# Patient Record
Sex: MALE | Race: WHITE | NOT HISPANIC OR LATINO | URBAN - METROPOLITAN AREA
[De-identification: names, ages, dates, MRNs, and addresses within clinical notes are randomized per-mention and may not be internally consistent; named-entity substitution may affect disease eponyms.]

---

## 2023-11-07 PROCEDURE — 88305 TISSUE EXAM BY PATHOLOGIST: CPT | Performed by: DERMATOLOGY

## 2023-11-07 PROCEDURE — 88350 IMFLUOR EA ADDL 1ANTB STN PX: CPT | Performed by: DERMATOLOGY

## 2023-11-07 PROCEDURE — 88346 IMFLUOR 1ST 1ANTB STAIN PX: CPT | Performed by: DERMATOLOGY

## 2023-11-09 ENCOUNTER — LAB REQUISITION (OUTPATIENT)
Dept: LAB | Facility: HOSPITAL | Age: 83
End: 2023-11-09
Payer: MEDICARE

## 2023-11-09 DIAGNOSIS — L98.9 DISORDER OF THE SKIN AND SUBCUTANEOUS TISSUE, UNSPECIFIED: ICD-10-CM

## 2023-11-17 LAB
LAB AP ASR DISCLAIMER: NORMAL
LABORATORY COMMENT REPORT: NORMAL
PATH REPORT.FINAL DX SPEC: NORMAL
PATH REPORT.GROSS SPEC: NORMAL
PATH REPORT.MICROSCOPIC SPEC OTHER STN: NORMAL
PATH REPORT.RELEVANT HX SPEC: NORMAL
PATH REPORT.TOTAL CANCER: NORMAL

## 2024-06-12 ENCOUNTER — LAB REQUISITION (OUTPATIENT)
Dept: LAB | Facility: HOSPITAL | Age: 84
End: 2024-06-12
Payer: COMMERCIAL

## 2024-06-13 LAB
LABORATORY COMMENT REPORT: NORMAL
PATH REPORT.GROSS SPEC: NORMAL
PATH REPORT.TOTAL CANCER: NORMAL

## 2024-09-11 ENCOUNTER — LAB REQUISITION (OUTPATIENT)
Dept: LAB | Facility: HOSPITAL | Age: 84
End: 2024-09-11
Payer: COMMERCIAL

## 2024-09-12 LAB
LABORATORY COMMENT REPORT: NORMAL
PATH REPORT.GROSS SPEC: NORMAL
PATH REPORT.TOTAL CANCER: NORMAL

## 2024-09-18 ENCOUNTER — LAB REQUISITION (OUTPATIENT)
Dept: LAB | Facility: HOSPITAL | Age: 84
End: 2024-09-18
Payer: COMMERCIAL

## 2024-09-19 LAB
LABORATORY COMMENT REPORT: NORMAL
PATH REPORT.GROSS SPEC: NORMAL
PATH REPORT.TOTAL CANCER: NORMAL

## 2025-01-01 ENCOUNTER — HOSPITAL ENCOUNTER (EMERGENCY)
Facility: HOSPITAL | Age: 85
End: 2025-02-05
Attending: EMERGENCY MEDICINE
Payer: MEDICARE

## 2025-01-01 VITALS — OXYGEN SATURATION: 45 %

## 2025-01-01 DIAGNOSIS — I46.9 CARDIOPULMONARY ARREST (MULTI): Primary | ICD-10-CM

## 2025-01-01 LAB
ANION GAP BLDV CALCULATED.4IONS-SCNC: 21 MMOL/L (ref 10–25)
BASE EXCESS BLDV CALC-SCNC: -16.9 MMOL/L (ref -2–3)
BODY TEMPERATURE: ABNORMAL
CA-I BLDV-SCNC: 1.2 MMOL/L (ref 1.1–1.33)
CHLORIDE BLDV-SCNC: 101 MMOL/L (ref 98–107)
CRITICAL CALL TIME: 1434
CRITICAL CALLED BY: ABNORMAL
CRITICAL CALLED TO: ABNORMAL
CRITICAL READ BACK: ABNORMAL
GLUCOSE BLDV-MCNC: 225 MG/DL (ref 74–99)
HCO3 BLDV-SCNC: 17.7 MMOL/L (ref 22–26)
HCT VFR BLD EST: 35 % (ref 41–52)
HGB BLDV-MCNC: 11.6 G/DL (ref 13.5–17.5)
LACTATE BLDV-SCNC: 9.2 MMOL/L (ref 0.4–2)
OXYHGB MFR BLDV: 27.4 % (ref 45–75)
PCO2 BLDV: 99 MM HG (ref 41–51)
PH BLDV: 6.86 PH (ref 7.33–7.43)
PO2 BLDV: 32 MM HG (ref 35–45)
POTASSIUM BLDV-SCNC: 6.2 MMOL/L (ref 3.5–5.3)
SAO2 % BLDV: 28 % (ref 45–75)
SODIUM BLDV-SCNC: 133 MMOL/L (ref 136–145)

## 2025-01-01 PROCEDURE — 99285 EMERGENCY DEPT VISIT HI MDM: CPT | Mod: 25 | Performed by: EMERGENCY MEDICINE

## 2025-01-01 PROCEDURE — 82435 ASSAY OF BLOOD CHLORIDE: CPT

## 2025-01-01 PROCEDURE — 36680 INSERT NEEDLE BONE CAVITY: CPT

## 2025-01-01 PROCEDURE — 92950 HEART/LUNG RESUSCITATION CPR: CPT

## 2025-01-01 PROCEDURE — 92950 HEART/LUNG RESUSCITATION CPR: CPT | Performed by: EMERGENCY MEDICINE

## 2025-01-01 PROCEDURE — 2500000004 HC RX 250 GENERAL PHARMACY W/ HCPCS (ALT 636 FOR OP/ED): Performed by: EMERGENCY MEDICINE

## 2025-01-01 PROCEDURE — 31500 INSERT EMERGENCY AIRWAY: CPT | Performed by: EMERGENCY MEDICINE

## 2025-01-01 PROCEDURE — 99285 EMERGENCY DEPT VISIT HI MDM: CPT | Performed by: EMERGENCY MEDICINE

## 2025-01-01 PROCEDURE — 82810 BLOOD GASES O2 SAT ONLY: CPT

## 2025-01-01 PROCEDURE — 31500 INSERT EMERGENCY AIRWAY: CPT

## 2025-01-01 RX ORDER — SODIUM CHLORIDE 9 MG/ML
INJECTION, SOLUTION INTRAVENOUS
Status: COMPLETED | OUTPATIENT
Start: 2025-01-01 | End: 2025-01-01

## 2025-01-01 RX ORDER — INDOMETHACIN 25 MG/1
CAPSULE ORAL CODE/TRAUMA/SEDATION MEDICATION
Status: COMPLETED | OUTPATIENT
Start: 2025-01-01 | End: 2025-01-01

## 2025-01-01 RX ORDER — EPINEPHRINE 0.1 MG/ML
INJECTION INTRACARDIAC; INTRAVENOUS CODE/TRAUMA/SEDATION MEDICATION
Status: COMPLETED | OUTPATIENT
Start: 2025-01-01 | End: 2025-01-01

## 2025-01-01 RX ADMIN — SODIUM BICARBONATE 50 MEQ: 84 INJECTION, SOLUTION INTRAVENOUS at 14:29

## 2025-01-01 RX ADMIN — SODIUM CHLORIDE 125 ML/HR: 9 INJECTION, SOLUTION INTRAVENOUS at 14:27

## 2025-01-01 RX ADMIN — EPINEPHRINE 1 MG: 0.1 INJECTION INTRACARDIAC; INTRAVENOUS at 14:31

## 2025-01-01 RX ADMIN — EPINEPHRINE 1 MG: 0.1 INJECTION INTRACARDIAC; INTRAVENOUS at 14:28

## 2025-02-05 NOTE — ED PROVIDER NOTES
HPI   Chief Complaint   Patient presents with    Cardiac Arrest       HPI        Patient History   No past medical history on file.  No past surgical history on file.  No family history on file.  Social History     Tobacco Use    Smoking status: Not on file    Smokeless tobacco: Not on file   Substance Use Topics    Alcohol use: Not on file    Drug use: Not on file       Physical Exam   ED Triage Vitals   Temp Heart Rate Respirations BP   -- 02/05/25 1431 02/05/25 1431 --    (!) 101 (!) 101       Pulse Ox Temp src Heart Rate Source Patient Position   02/05/25 1430 -- -- --   (!) 61 %         BP Location FiO2 (%)     -- --             Physical Exam      ED Course & MDM   Diagnoses as of 02/05/25 1508   Cardiopulmonary arrest (Multi)                 No data recorded     Kim Coma Scale Score: 3 (02/05/25 1441 : Misa Leone RN)                           Medical Decision Making  The patient was seen by the resident/fellow.  I have personally performed a substantive portion of the encounter.  I have seen and examined the patient; agree with the workup, evaluation, MDM, management and diagnosis.  The care plan has been discussed with the resident; I have reviewed the resident's note and agree with the documented findings.      This is a 84-year-old male who presents to the ER in cardiac arrest.  Patient was given epinephrine x 3 by EMS.  Initially patient was in V-fib and shocked him in asystole.  I gel airway was placed.  We intubated him on arrival here.  Patient was in the bathroom and fell.  His family member or caregiver found him in there.  EMS was called out 30 minutes prior to the arrival here.  Patient did have left leg tibial intraosseous line placed.  CPR was in progress on arrival here.  Glucose was in the 200s for EMS.  He was reportedly just in the hospital for a fall.    There is no records available here in epic.    No spontaneous breath sounds.  No spontaneous cardiac sounds.  Pupils are fixed and  dilated.  No purposeful movements.    CPR was continued here.  Please see CPR flowsheet for medications.  Patient was given epinephrine and bicarb.    Patient was in asystole here and PEA.  He was a very bradycardic PEA.    At the time of death when the code was called we did bedside ultrasound.  There is no organized cardiac activity.  There was PEA on the ultrasound.  No palpable pulses.    Time of death        Procedure  Procedures

## 2025-02-05 NOTE — ED PROCEDURE NOTE
Procedure  Intubation    Performed by: Mau Malik DO  Authorized by: Felipe Negro DO    Consent:     Consent obtained:  Emergent situation  Universal protocol:     Patient identity confirmed:  Arm band and hospital-assigned identification number  Pre-procedure details:     Indications: cardio/pulmonary arrest      Obstruction: none      Pharmacologic strategy: none    Procedure details:     Preoxygenation:  Bag valve mask    Number of attempts:  1  Successful intubation attempt details:     Intubation method:  Oral    Intubation technique: video assisted      Laryngoscope blade:  Hypercurved    Bougie used: no      Tube size (mm):  7.5    Tube type:  Cuffed    Tube visualized through cords: yes    Placement assessment:     ETT at teeth/gumline (cm):  25    Tube secured with:  ETT bridges    Breath sounds:  Equal and absent over the epigastrium    Placement verification: chest rise, colorimetric ETCO2, direct visualization and waveform ETCO2      CXR findings:  Appropriate position  Post-procedure details:     Procedure completion:  Tolerated               Mau Malik DO  Resident  02/05/25 2840

## 2025-02-05 NOTE — ED PROVIDER NOTES
EMERGENCY DEPARTMENT ENCOUNTER      Pt Name: Georgi Hong  MRN: [id number]  Birthdate 1940  Date of evaluation: 2/5/2025  Provider: Mau Malik DO    CHIEF COMPLAINT       Chief Complaint   Patient presents with    Cardiac Arrest         HISTORY OF PRESENT ILLNESS    Patient is an 84-year-old male presenting to the emergency department as a full cardiac arrest.  Brought in via EMS.  Patient was found at home, bystander CPR was performed.  On arrival to the ER there was around 30 minutes of CPR that was performed.          Nursing Notes were reviewed.    PAST MEDICAL HISTORY   No past medical history on file.      SURGICAL HISTORY     No past surgical history on file.      CURRENT MEDICATIONS       Previous Medications    No medications on file       ALLERGIES     Patient has no allergy information on record.    FAMILY HISTORY     No family history on file.       SOCIAL HISTORY          SCREENINGS                        PHYSICAL EXAM    (up to 7 for level 4, 8 or more for level 5)     ED Triage Vitals   Temp Pulse Resp BP   -- -- -- --      SpO2 Temp src Heart Rate Source Patient Position   -- -- -- --      BP Location FiO2 (%)     -- --       Physical Exam     DIAGNOSTIC RESULTS     LABS:  Labs Reviewed   BLOOD GAS VENOUS FULL PANEL UNSOLICITED - Abnormal       Result Value    POCT pH, Venous 6.86 (*)     POCT pCO2, Venous 99 (*)     POCT pO2, Venous 32 (*)     POCT SO2, Venous 28 (*)     POCT Oxy Hemoglobin, Venous 27.4 (*)     POCT Hematocrit Calculated, Venous 35.0 (*)     POCT Sodium, Venous 133 (*)     POCT Potassium, Venous 6.2 (*)     POCT Chloride, Venous 101      POCT Ionized Calicum, Venous 1.20      POCT Glucose, Venous 225 (*)     POCT Lactate, Venous 9.2 (*)     POCT Base Excess, Venous -16.9 (*)     POCT HCO3 Calculated, Venous 17.7 (*)     POCT Hemoglobin, Venous 11.6 (*)     POCT Anion Gap, Venous 21.0      Patient Temperature        Critical Called By MT RT      Critical Called To MD MALIK       Critical Call Time 1434      Critical Read Back Y     BLOOD GAS LACTIC ACID, VENOUS       All other labs were within normal range or not returned as of this dictation.    Imaging  No orders to display        Procedures  Please see separate procedure documentation for further details.     EMERGENCY DEPARTMENT COURSE/MDM:   Medical Decision Making  84-year-old male presenting today via EMS as a full cardiac arrest.  Patient was down for around 20 minutes prior to arrival.  Had received around 10 minutes of CPR from EMS services.  Received 2 rounds of epi prior to arrival.  Received 2 additional rounds of epinephrine during CPR in our ER.  Patient was asystole initially, second rhythm check with PEA.  Bedside ultrasound showed no cardiac activity.  Time of death was called at 1433.         Please see ED course for additional MDM.    Diagnoses as of 02/05/25 1449   Cardiopulmonary arrest (Multi)        No orders to display       Patient and or family in agreement and understanding of treatment plan.  All questions answered.      I reviewed the case with the attending ED physician. The attending ED physician agrees with the plan. Patient and/or patient´s representative was counseled regarding labs, imaging, likely diagnosis, and plan. All questions were answered.    ED Medications administered this visit:    Medications   EPINEPHrine (Adrenalin) 1 mg/10mL injection (1 mg intravenous Given 2/5/25 1431)   sodium bicarbonate injection (50 mEq intravenous Given 2/5/25 1429)   sodium chloride 0.9% infusion (0 mL/hr intravenous Stopped 2/5/25 1433)       New Prescriptions from this visit:    New Prescriptions    No medications on file       Follow-up:  No follow-up provider specified.      Final Impression:   1. Cardiopulmonary arrest (Multi)          (Please note that portions of this note were completed with a voice recognition program.  Efforts were made to edit the dictations but occasionally words are  mis-transcribed.)     Mau Malik DO  Resident  02/05/25 1587